# Patient Record
Sex: FEMALE | Race: BLACK OR AFRICAN AMERICAN | Employment: FULL TIME | URBAN - METROPOLITAN AREA
[De-identification: names, ages, dates, MRNs, and addresses within clinical notes are randomized per-mention and may not be internally consistent; named-entity substitution may affect disease eponyms.]

---

## 2022-02-22 LAB
LEFT VENTRICULAR EJECTION FRACTION HIGH VALUE: 60 %
LEFT VENTRICULAR EJECTION FRACTION MODE: NORMAL
LV EF: 50 %
LV EF: 58 %
LVEF MODALITY: NORMAL

## 2022-03-01 ENCOUNTER — OFFICE VISIT (OUTPATIENT)
Dept: CARDIOLOGY CLINIC | Age: 67
End: 2022-03-01
Payer: COMMERCIAL

## 2022-03-01 VITALS
DIASTOLIC BLOOD PRESSURE: 68 MMHG | OXYGEN SATURATION: 98 % | TEMPERATURE: 97 F | HEART RATE: 90 BPM | HEIGHT: 68 IN | WEIGHT: 199 LBS | SYSTOLIC BLOOD PRESSURE: 132 MMHG | BODY MASS INDEX: 30.16 KG/M2

## 2022-03-01 DIAGNOSIS — E78.5 DYSLIPIDEMIA: ICD-10-CM

## 2022-03-01 DIAGNOSIS — E66.9 OBESITY, CLASS I, BMI 30-34.9: ICD-10-CM

## 2022-03-01 DIAGNOSIS — R94.31 ABNORMAL EKG: Primary | ICD-10-CM

## 2022-03-01 DIAGNOSIS — D86.9 SARCOIDOSIS: ICD-10-CM

## 2022-03-01 DIAGNOSIS — Z82.49 FAMILY HISTORY OF CORONARY ARTERY DISEASE: ICD-10-CM

## 2022-03-01 PROCEDURE — 99214 OFFICE O/P EST MOD 30 MIN: CPT | Performed by: INTERNAL MEDICINE

## 2022-03-01 RX ORDER — TRIAMCINOLONE ACETONIDE 5 MG/G
CREAM TOPICAL 3 TIMES DAILY
COMMUNITY

## 2022-03-01 RX ORDER — IBUPROFEN 800 MG/1
800 TABLET ORAL EVERY 6 HOURS PRN
COMMUNITY

## 2022-03-01 RX ORDER — ATORVASTATIN CALCIUM 40 MG/1
40 TABLET, FILM COATED ORAL NIGHTLY
COMMUNITY

## 2022-03-01 RX ORDER — NEOMYCIN SULFATE, POLYMYXIN B SULFATE AND BACITRACIN ZINC 3.5; 10000; 4 MG/G; [USP'U]/G; [USP'U]/G
OINTMENT OPHTHALMIC 4 TIMES DAILY
COMMUNITY

## 2022-03-01 NOTE — PROGRESS NOTES
Chief Complaint   Patient presents with    Follow-Up from [place] [id number]       3-1-22: Patient presents for initial medical evaluation. Patient is followed on a regular basis by Dr. Jordan Daugherty MD. Patient with hx of sarcoidosis. Has not had any issues for over 20 yrs. S/p hospitalization for atypical CP that's now resolved. . S/p normal VQ scan and echo. She has itching symptoms at this time. Pt denies chest pain, dyspnea, dyspnea on exertion, change in exercise capacity, fatigue,  nausea, vomiting, diarrhea, constipation, motor weakness, insomnia, weight loss, syncope, dizziness, lightheadedness, palpitations, PND, orthopnea, or claudication.  ekg with lateral T wave inversion. Patient Active Problem List   Diagnosis    Sarcoidosis    Dyslipidemia    Family history of coronary artery disease    Obesity, Class I, BMI 30-34.9       Past Surgical History:   Procedure Laterality Date    FINGER SURGERY      broken midlle finger    HYSTERECTOMY  1989    Supracervical- fibroids.  Has ovaries    SHOULDER SURGERY Right 1979    shoulder dislocation    TUBAL LIGATION  1981       Social History     Socioeconomic History    Marital status: Single     Spouse name: Not on file    Number of children: Not on file    Years of education: Not on file    Highest education level: Not on file   Occupational History    Not on file   Tobacco Use    Smoking status: Former Smoker    Smokeless tobacco: Not on file   Substance and Sexual Activity    Alcohol use: No    Drug use: Not on file    Sexual activity: Not on file   Other Topics Concern    Not on file   Social History Narrative    Not on file     Social Determinants of Health     Financial Resource Strain:     Difficulty of Paying Living Expenses: Not on file   Food Insecurity:     Worried About [address] in the Last Year: Not on file    Sebastian of Food in the Last Year: Not on file   Transportation Needs:     Lack of Transportation (Medical): Not on file    Lack of Transportation (Non-Medical): Not on file   Physical Activity:     Days of Exercise per Week: Not on file    Minutes of Exercise per Session: Not on file   Stress:     Feeling of Stress : Not on file   Social Connections:     Frequency of Communication with Friends and Family: Not on file    Frequency of Social Gatherings with Friends and Family: Not on file    Attends Yazdanism Services: Not on file    Active Member of [address] [place] [address] or Organizations: Not on file    Attends Club or Organization Meetings: Not on file    Marital Status: Not on file   Intimate Partner Violence:     Fear of Current or Ex-Partner: Not on file    Emotionally Abused: Not on file    Physically Abused: Not on file    Sexually Abused: Not on file   Housing Stability:     Unable to Pay for Housing in the Last Year: Not on file    Number of [place] in the Last Year: Not on file    Unstable Housing in the Last Year: Not on file       No family history on file. Current Outpatient Medications   Medication Sig Dispense Refill    atorvastatin (LIPITOR) 40 MG tablet Take 40 mg by mouth at bedtime      ibuprofen (ADVIL;MOTRIN) 800 MG tablet Take 800 mg by mouth every 6 hours as needed for Pain      triamcinolone (ARISTOCORT) 0.5 % cream Apply topically 3 times daily Apply topically 3 times daily.  neomycin-bacitracin-polymyxin (NEOSPORIN) [phone] ophthalmic ointment 4 times daily 4 times daily. No current facility-administered medications for this visit. Codeine, Imitrex [sumatriptan], and Sulfa antibiotics    Review of Systems:  General ROS: negative  Psychological ROS: negative  Hematological and Lymphatic ROS: No history of blood clots or bleeding disorder.    Respiratory ROS: no cough, shortness of breath, or wheezing  Cardiovascular ROS: no chest pain or dyspnea on exertion  Gastrointestinal ROS: no abdominal pain, change in bowel habits, or black or bloody stools  Genito-Urinary ROS: no dysuria, trouble voiding, or hematuria  Musculoskeletal ROS: negative  Neurological ROS: no TIA or stroke symptoms  Dermatological ROS: negative    VITALS:  Blood pressure 132/68, pulse 90, temperature 97 °F (36.1 °C), height 5' 8\" (1.727 m), weight 199 lb (90.3 kg), SpO2 98 %. Body mass index is 30.26 kg/m². Physical Examination:  General appearance - alert, well appearing, and in no distress  Mental status - alert, oriented to person, place, and time  Neck - Neck is supple, no JVD or carotid bruits. No thyromegaly or adenopathy. Chest - clear to auscultation, no wheezes, rales or rhonchi, symmetric air entry  Heart - normal rate, regular rhythm, normal S1, S2, no murmurs, rubs, clicks or gallops  Abdomen - soft, nontender, nondistended, no masses or organomegaly  Neurological - alert, oriented, normal speech, no focal findings or movement disorder noted  Extremities - peripheral pulses normal, no pedal edema, no clubbing or cyanosis  Skin - normal coloration and turgor, no rashes, no suspicious skin lesions noted      EKG: normal sinus rhythm, nonspecific ST and T waves changes    Orders Placed This Encounter   Procedures    NM MYOCARDIAL SPECT REST EXERCISE OR RX       ASSESSMENT:     Diagnosis Orders   1. Abnormal EKG  NM MYOCARDIAL SPECT REST EXERCISE OR RX   2. Sarcoidosis     3. Dyslipidemia     4. Family history of coronary artery disease     5. Obesity, Class I, BMI 30-34.9           PLAN:       As always, aggressive risk factor modification is strongly recommended. We should adhere to the JNC VIII guidelines for HTN management and the NCEP ATP III guidelines for LDL-C management. Cardiac diet is always recommended with low fat, cholesterol, calories and sodium. Continue medications at current doses. Follow up with PCP     Obtain a nuclear myocardial perfusion stress test to r/o cardiac ischemia.  Given abn EKG and atypical CP    Patient was advised and encouraged to check blood pressure at home or at a pharmacy, maintain a logbook, and also call us back if blood pressure are above the target ranges or if it is low. Patient clearly understands and agrees to the instructions. We will need to continue to monitor muscle and liver enzymes, BUN, CR, and electrolytes.

## 2022-03-18 ENCOUNTER — HOSPITAL ENCOUNTER (OUTPATIENT)
Dept: NUCLEAR MEDICINE | Age: 67
Discharge: HOME | End: 2022-03-20
Payer: COMMERCIAL

## 2022-03-18 ENCOUNTER — HOSPITAL ENCOUNTER (OUTPATIENT)
Dept: NON INVASIVE DIAGNOSTICS | Age: 67
Discharge: HOME | End: 2022-03-18
Payer: COMMERCIAL

## 2022-03-18 DIAGNOSIS — R94.31 ABNORMAL EKG: ICD-10-CM

## 2022-03-18 LAB
LV EF: 80 %
LVEF MODALITY: NORMAL

## 2022-03-18 PROCEDURE — 78452 HT MUSCLE IMAGE SPECT MULT: CPT | Performed by: INTERNAL MEDICINE

## 2022-03-18 PROCEDURE — A9502 TC99M TETROFOSMIN: HCPCS | Performed by: INTERNAL MEDICINE

## 2022-03-18 PROCEDURE — 93017 CV STRESS TEST TRACING ONLY: CPT

## 2022-03-18 PROCEDURE — 6360000002 HC RX W HCPCS: Performed by: INTERNAL MEDICINE

## 2022-03-18 PROCEDURE — 2580000003 HC RX 258: Performed by: INTERNAL MEDICINE

## 2022-03-18 PROCEDURE — 3430000000 HC RX DIAGNOSTIC RADIOPHARMACEUTICAL: Performed by: INTERNAL MEDICINE

## 2022-03-18 PROCEDURE — 78452 HT MUSCLE IMAGE SPECT MULT: CPT

## 2022-03-18 RX ORDER — ISOSORBIDE MONONITRATE 30 MG/1
30 TABLET, EXTENDED RELEASE ORAL DAILY
COMMUNITY
Start: 2022-02-16

## 2022-03-18 RX ORDER — SODIUM CHLORIDE 0.9 % (FLUSH) 0.9 %
10 SYRINGE (ML) INJECTION PRN
Status: DISCONTINUED | OUTPATIENT
Start: 2022-03-18 | End: 2022-03-21 | Stop reason: HOSPADM

## 2022-03-18 RX ADMIN — TETROFOSMIN 11.9 MILLICURIE: 1.38 INJECTION, POWDER, LYOPHILIZED, FOR SOLUTION INTRAVENOUS at 08:05

## 2022-03-18 RX ADMIN — Medication 10 ML: at 08:05

## 2022-03-18 RX ADMIN — Medication 10 ML: at 09:17

## 2022-03-18 RX ADMIN — TETROFOSMIN 33 MILLICURIE: 1.38 INJECTION, POWDER, LYOPHILIZED, FOR SOLUTION INTRAVENOUS at 09:17

## 2022-03-18 RX ADMIN — Medication 10 ML: at 09:18

## 2022-03-18 RX ADMIN — REGADENOSON 0.4 MG: 0.08 INJECTION, SOLUTION INTRAVENOUS at 09:17

## 2022-03-18 NOTE — PROGRESS NOTES
Reviewed history, allergies, and medications. Consent confirmed. Lexiscan exam explained. Placed patient on monitor. [facility] to inject Wilver Mejias. SOB noted during recovery phase. Denied chest pain. No ectopy noted. Patient off monitor and instructed to eat, will have last part of exam in 1 hour.

## 2024-03-01 ENCOUNTER — HOSPITAL ENCOUNTER (OUTPATIENT)
Dept: RADIOLOGY | Facility: CLINIC | Age: 69
Discharge: HOME | End: 2024-03-01
Payer: MEDICARE

## 2024-03-01 ENCOUNTER — OFFICE VISIT (OUTPATIENT)
Dept: ORTHOPEDIC SURGERY | Facility: CLINIC | Age: 69
End: 2024-03-01
Payer: MEDICARE

## 2024-03-01 VITALS — WEIGHT: 197 LBS | BODY MASS INDEX: 29.86 KG/M2 | HEIGHT: 68 IN

## 2024-03-01 DIAGNOSIS — M54.12 CERVICAL RADICULOPATHY, ACUTE: ICD-10-CM

## 2024-03-01 DIAGNOSIS — M54.2 NECK PAIN: ICD-10-CM

## 2024-03-01 DIAGNOSIS — M54.2 NECK PAIN: Primary | ICD-10-CM

## 2024-03-01 PROCEDURE — 99214 OFFICE O/P EST MOD 30 MIN: CPT | Performed by: PHYSICIAN ASSISTANT

## 2024-03-01 PROCEDURE — 72050 X-RAY EXAM NECK SPINE 4/5VWS: CPT | Performed by: ORTHOPAEDIC SURGERY

## 2024-03-01 PROCEDURE — 72050 X-RAY EXAM NECK SPINE 4/5VWS: CPT

## 2024-03-01 RX ORDER — PREDNISONE 10 MG/1
TABLET ORAL
Qty: 30 TABLET | Refills: 0 | Status: SHIPPED | OUTPATIENT
Start: 2024-03-01

## 2024-03-01 RX ORDER — CYCLOBENZAPRINE HCL 10 MG
10 TABLET ORAL NIGHTLY PRN
Qty: 14 TABLET | Refills: 0 | Status: SHIPPED | OUTPATIENT
Start: 2024-03-01 | End: 2024-03-15

## 2024-03-01 NOTE — LETTER
March 1, 2024     Patient: Mani Martinez   YOB: 1955   Date of Visit: 3/1/2024       To Whom It May Concern:    Mani Martinez was seen in our office 3/1/24. It is my medical opinion that Mani Martinez may return to light duty immediately with the following restrictions: No lifting anything over 10 lbs. Please excuse her from work 2/26/24- 3/1/24. Restrictions until her next follow up appointment.     If you have any questions or concerns, please don't hesitate to call.        Gutierrez Mason PA-C    CC: No Recipients

## 2024-03-01 NOTE — Clinical Note
March 1, 2024     Patient: Mani Martinez   YOB: 1955   Date of Visit: 3/1/2024       To Whom It May Concern:    It is my medical opinion that Mani Martinez {Work release (duty restriction):37675}.    If you have any questions or concerns, please don't hesitate to call.         Sincerely,        Gutierrez Mason PA-C    CC: No Recipients

## 2024-03-01 NOTE — PROGRESS NOTES
Mani Martinez is a 68 y.o. female who presents for Pain of the Neck (2/25/24 - Lifted Box in awkward way, awoke w/ pain 2/26/24 - X-Rays Today - No prior Sx for Neck Pain ).    HPI:  68-year-old female here for neck pain after lifting a heavy box.  She denies any fever chills nausea vomiting night sweats.  She has no bowel or bladder complaints.    Physical exam:  Well-nourished, well kept.  No lymphangitis or lymphadenopathy in the examined extremities.  Good perfusion to the extremities.  No distal edema.  Patient can rise from a seated position, can sit from a standing position. Can get up heels and toes.  Patient is tender in the paraspinal musculature of the cervical spine is range of motion is mildly decreased secondary to some pain and stiffness no weakness no instability to muscle strength. examination of the upper extremities reveals no point tenderness, swelling, or deformity.  Range of motion of the shoulders, elbows, wrists, and fingers are full without crepitance, instability, or exacerbation of pain. Strength is 5/5 throughout. no redness, abrasions, or lesions on the upper extremities bilaterally.  Gross sensation intact to the extremities.  Deep tendon reflexes1+ and symmetric bilaterally.  Paul negative.  Affect normal.  Alert and oriented X 3.  Coordination normal.    Imaging studies:  We ordered and reviewed AP lateral plain films of the cervical spine.    Assessment:  68-year-old female here in the walk-in clinic.  She was awkwardly pushing a heavy box up some stairs a few days ago, when she woke up the next morning she started having right sided neck trapezius periscapular pain and arm pain radiating down the right arm.  She does not normally have chronic neck or upper extremity problems.  She has not really done anything for this conservatively some heat or ice over-the-counter medications etc.  Nothing is helping.  This is affecting her bodily function.  She has seen Dr. Sahni in the  past.  History of spine surgery    Plan:  I like to get her into some physical therapy, something with a manual component modalities, I like to get an MRI of her cervical spine without contrast, we can get her a 10-day tapering steroid pack and some muscle relaxer she can take at bedtime.  I will see her back after the MRI.

## 2024-03-04 ENCOUNTER — EVALUATION (OUTPATIENT)
Dept: PHYSICAL THERAPY | Facility: CLINIC | Age: 69
End: 2024-03-04
Payer: MEDICARE

## 2024-03-04 ENCOUNTER — APPOINTMENT (OUTPATIENT)
Dept: PAIN MEDICINE | Facility: CLINIC | Age: 69
End: 2024-03-04
Payer: COMMERCIAL

## 2024-03-04 DIAGNOSIS — M54.12 CERVICAL RADICULOPATHY, ACUTE: ICD-10-CM

## 2024-03-04 DIAGNOSIS — M54.2 NECK PAIN: ICD-10-CM

## 2024-03-04 PROCEDURE — 97110 THERAPEUTIC EXERCISES: CPT | Mod: GP

## 2024-03-04 PROCEDURE — 97161 PT EVAL LOW COMPLEX 20 MIN: CPT | Mod: GP

## 2024-03-04 PROCEDURE — 97140 MANUAL THERAPY 1/> REGIONS: CPT | Mod: GP

## 2024-03-04 ASSESSMENT — ENCOUNTER SYMPTOMS
DEPRESSION: 0
LOSS OF SENSATION IN FEET: 0
OCCASIONAL FEELINGS OF UNSTEADINESS: 0

## 2024-03-15 ENCOUNTER — APPOINTMENT (OUTPATIENT)
Dept: PAIN MEDICINE | Facility: CLINIC | Age: 69
End: 2024-03-15
Payer: COMMERCIAL

## 2024-03-19 ENCOUNTER — TREATMENT (OUTPATIENT)
Dept: PHYSICAL THERAPY | Facility: CLINIC | Age: 69
End: 2024-03-19
Payer: MEDICARE

## 2024-03-19 DIAGNOSIS — M54.2 NECK PAIN: ICD-10-CM

## 2024-03-19 DIAGNOSIS — M54.12 CERVICAL RADICULOPATHY, ACUTE: ICD-10-CM

## 2024-03-19 PROCEDURE — 97110 THERAPEUTIC EXERCISES: CPT | Mod: GP

## 2024-03-19 PROCEDURE — 97140 MANUAL THERAPY 1/> REGIONS: CPT | Mod: GP

## 2024-03-19 NOTE — PROGRESS NOTES
Physical Therapy Treatment    Patient Name: Mani Martinez  MRN: [id number]  Time Calculation  Start Time: 0915  Stop Time: 0955  Time Calculation (min): 40 min     PT Therapeutic Procedures Time Entry  Manual Therapy Time Entry: 10  Therapeutic Exercise Time Entry: 30                   Current Problem  1. Neck pain  Follow Up In Physical Therapy      2. Cervical radiculopathy, acute  Follow Up In Physical Therapy          Subjective   General  Pt is a 68 y.o. F that reports to the clinic with some improvement in cervical radicular Sx. R UE radicular Sx are radiating down anterior forearm to the elbow, however less intense. She states no new onset of headaches or recents falls since initial evaluation. She has MRI on March 27, 2024.     Pain  4/10    Objective     Treatments:  UT stretch 2 x 30s  Chin tucks 3 x 10   Median nerve glides x 20   Cervical extension with towel x 20  Horizontal abd pullouts, green TB, 2x 10   Chin tucks into green TB 2 x 10*   ER pullouts, green TB, 2x 10 *  Green TB rows and shoulder ext 2 x 10 *    Manual: P to A mobilization on C5-7 recreated Sx and decreased Sx   Traction - 10 min, 10lbs- not tolerated well    *added to HEP     Assessment   Pt is a 68 y.o. F that reports to the clinic with some improvement in cervical radicular Sx. R UE Radicular Sx are radiating down anterior forearm to the elbow. She states no new onset of headaches or recents falls since initial evaluation. She has MRI on March 27, 2024. She was able to tolerate new exercises and resistance added. She did not respond well to traction d/t excessive stretch. C spine mobilizations eliminated radicular Sx. Pt stands to benefit from skilled PT in order to improve scapular stabilization and mobility, UE flexibility, ROM, and strength to return to PLOF.     Plan   Continue current POC, progress as tolerated    CARMEN BULLOCK, S-PT

## 2024-03-26 ENCOUNTER — TREATMENT (OUTPATIENT)
Dept: PHYSICAL THERAPY | Facility: CLINIC | Age: 69
End: 2024-03-26
Payer: MEDICARE

## 2024-03-26 DIAGNOSIS — M54.12 CERVICAL RADICULOPATHY, ACUTE: ICD-10-CM

## 2024-03-26 DIAGNOSIS — M54.2 NECK PAIN: Primary | ICD-10-CM

## 2024-03-26 PROCEDURE — 97140 MANUAL THERAPY 1/> REGIONS: CPT | Mod: GP

## 2024-03-26 PROCEDURE — 97110 THERAPEUTIC EXERCISES: CPT | Mod: GP

## 2024-03-26 NOTE — PROGRESS NOTES
Physical Therapy Treatment    Patient Name: Mani Martinez  MRN: [id number]  Time Calculation  Start Time: 0836  Stop Time: 0914  Time Calculation (min): 38 min     PT Therapeutic Procedures Time Entry  Manual Therapy Time Entry: 28  Therapeutic Exercise Time Entry: 10                   Current Problem  1. Neck pain        2. Cervical radiculopathy, acute            Subjective   General  Pt presents to the clinic today with cervicogenic headache Sx. She did work 12 hours yesterday and has some tenderness alone R UE lateral extensors d/t overuse when typing. Denies any radicular Sx.     Pain  10/10 with migraine     Objective     Treatments:  Cervical extension with towel   SNAGs    NV- radial nerve stretch/glide for lateral epicondylitis     Manual: C6-8 C spine mobilizations grade I- III  Dry needling performed by CAMILO Flores, BREEZYN  -R lateral extension    Kinesotape applied for lateral epicondylagia     Assessment   Pt presents to the clinic today with cervicogenic headache Sx. She did work 12 hours yesterday and has some tenderness alone R UE lateral extensors d/t overuse when typing. Denies any radicular Sx. C spine mobilizations were able to decrease intensity of migraine before leaving. Lateral epicondylitis pain/tenderness was treated with dry needling and taping for support when typing. Pt stands to benefit from skilled PT in order to improve scapular stabilization and mobility, UE flexibility, ROM, and strength to return to PLOF.     Plan   Continue current POC, progress as tolerated     CARMEN BULLOCK, S-PT

## 2024-04-02 ENCOUNTER — APPOINTMENT (OUTPATIENT)
Dept: PHYSICAL THERAPY | Facility: CLINIC | Age: 69
End: 2024-04-02
Payer: MEDICARE

## 2024-04-15 ENCOUNTER — HOSPITAL ENCOUNTER (OUTPATIENT)
Dept: RADIOLOGY | Facility: HOSPITAL | Age: 69
Discharge: HOME | End: 2024-04-15
Payer: MEDICARE

## 2024-04-15 DIAGNOSIS — M54.2 NECK PAIN: ICD-10-CM

## 2024-04-15 DIAGNOSIS — M54.12 CERVICAL RADICULOPATHY, ACUTE: ICD-10-CM

## 2024-04-15 PROCEDURE — 72141 MRI NECK SPINE W/O DYE: CPT

## 2024-04-15 PROCEDURE — 72141 MRI NECK SPINE W/O DYE: CPT | Performed by: RADIOLOGY
